# Patient Record
Sex: MALE | Race: WHITE | Employment: OTHER | URBAN - METROPOLITAN AREA
[De-identification: names, ages, dates, MRNs, and addresses within clinical notes are randomized per-mention and may not be internally consistent; named-entity substitution may affect disease eponyms.]

---

## 2024-03-05 ENCOUNTER — OFFICE VISIT (OUTPATIENT)
Dept: BARIATRICS/WEIGHT MGMT | Age: 64
End: 2024-03-05
Payer: MEDICARE

## 2024-03-05 VITALS
HEIGHT: 72 IN | BODY MASS INDEX: 42.66 KG/M2 | WEIGHT: 315 LBS | DIASTOLIC BLOOD PRESSURE: 80 MMHG | HEART RATE: 64 BPM | OXYGEN SATURATION: 95 % | SYSTOLIC BLOOD PRESSURE: 120 MMHG

## 2024-03-05 DIAGNOSIS — F32.A DEPRESSION, UNSPECIFIED DEPRESSION TYPE: ICD-10-CM

## 2024-03-05 DIAGNOSIS — R73.9 HYPERGLYCEMIA: ICD-10-CM

## 2024-03-05 DIAGNOSIS — M19.90 ARTHRITIS: ICD-10-CM

## 2024-03-05 DIAGNOSIS — Z90.49 S/P CHOLECYSTECTOMY: ICD-10-CM

## 2024-03-05 DIAGNOSIS — K44.9 HIATAL HERNIA: Primary | ICD-10-CM

## 2024-03-05 DIAGNOSIS — E66.01 OBESITY, CLASS III, BMI 40-49.9 (MORBID OBESITY) (HCC): ICD-10-CM

## 2024-03-05 DIAGNOSIS — Z86.19 HISTORY OF HEPATITIS B: ICD-10-CM

## 2024-03-05 PROCEDURE — G8484 FLU IMMUNIZE NO ADMIN: HCPCS | Performed by: NURSE PRACTITIONER

## 2024-03-05 PROCEDURE — 1123F ACP DISCUSS/DSCN MKR DOCD: CPT | Performed by: NURSE PRACTITIONER

## 2024-03-05 PROCEDURE — G8427 DOCREV CUR MEDS BY ELIG CLIN: HCPCS | Performed by: NURSE PRACTITIONER

## 2024-03-05 PROCEDURE — G8417 CALC BMI ABV UP PARAM F/U: HCPCS | Performed by: NURSE PRACTITIONER

## 2024-03-05 PROCEDURE — 1036F TOBACCO NON-USER: CPT | Performed by: NURSE PRACTITIONER

## 2024-03-05 PROCEDURE — 99204 OFFICE O/P NEW MOD 45 MIN: CPT | Performed by: NURSE PRACTITIONER

## 2024-03-05 PROCEDURE — 3017F COLORECTAL CA SCREEN DOC REV: CPT | Performed by: NURSE PRACTITIONER

## 2024-03-05 RX ORDER — SILDENAFIL 100 MG/1
100 TABLET, FILM COATED ORAL PRN
COMMUNITY
Start: 2024-03-04

## 2024-03-05 RX ORDER — TAMSULOSIN HYDROCHLORIDE 0.4 MG/1
0.4 CAPSULE ORAL DAILY
COMMUNITY
Start: 2024-02-06

## 2024-03-05 RX ORDER — ENTECAVIR 1 MG/1
1 TABLET, FILM COATED ORAL DAILY
COMMUNITY
Start: 2024-01-10

## 2024-03-05 RX ORDER — TADALAFIL 20 MG/1
20 TABLET ORAL PRN
COMMUNITY

## 2024-03-05 NOTE — PROGRESS NOTES
HMR NP Clinical Induction for [employer] Diet Progress Note    Subjective     The patient is a 64 y.o. male being seen regarding supervised weight loss.  The patient's PCP is Adeola Lancaster APRN - NP .  Highest adult weight was 355 lbs and lowest adult weight was 225 lbs.  His Body mass index is 46.35 kg/m².  His weight goal is 250-260 lbs.  The patient reports that his obesity is significantly affecting his life on a daily basis.      Weight Loss Program History:   He has tried HMR, Weight Watchers, Atkins, keto, Low Fat, Medifast, and Optifast diets.  These attempts have been somewhat successful with weight loss, but have failed to sustain adequate weight loss. The patient has also tried self directed diet and exercise in attempts to sustain weight loss.     Comorbid Conditions:  Significant diseases affecting this patient are: Hiatal Hernia, Depression, Hx Hepatitis B, Arthritis, s/p cholecystectomy      Allergies:  Allergies   Allergen Reactions    Oxycodone Nausea Only     States physically ill       Past Medical History:     Past Medical History:   Diagnosis Date    Abdominal hernia     Arthritis     Depression     History of hepatitis B    .    Past Surgical History:  Past Surgical History:   Procedure Laterality Date    CHOLECYSTECTOMY         Family History:  Family History   Problem Relation Age of Onset    Depression Mother     Cancer Mother     Arthritis Mother     Arthritis Father     Cancer Maternal Grandfather     Depression Son     Diabetes Maternal Uncle        Social History:  Social History     Socioeconomic History    Marital status: Single     Spouse name: Not on file    Number of children: Not on file    Years of education: Not on file    Highest education level: Not on file   Occupational History    Not on file   Tobacco Use    Smoking status: Former     Current packs/day: 1.00     Average packs/day: 0.5 packs/day for 72.2 years (36.2 ttl pk-yrs)     Types: Cigarettes

## 2024-03-06 ENCOUNTER — HOSPITAL ENCOUNTER (OUTPATIENT)
Age: 64
Discharge: HOME | End: 2024-03-06
Payer: MEDICARE

## 2024-03-06 DIAGNOSIS — E66.01 OBESITY, CLASS III, BMI 40-49.9 (MORBID OBESITY) (HCC): ICD-10-CM

## 2024-03-06 DIAGNOSIS — F32.A DEPRESSION, UNSPECIFIED DEPRESSION TYPE: ICD-10-CM

## 2024-03-06 DIAGNOSIS — Z86.19 HISTORY OF HEPATITIS B: ICD-10-CM

## 2024-03-06 DIAGNOSIS — K44.9 HIATAL HERNIA: ICD-10-CM

## 2024-03-06 DIAGNOSIS — R73.9 HYPERGLYCEMIA: ICD-10-CM

## 2024-03-06 DIAGNOSIS — M19.90 ARTHRITIS: ICD-10-CM

## 2024-03-06 LAB
ANION GAP SERPL CALCULATED.3IONS-SCNC: NORMAL MMOL/L (ref 9–17)
BASOPHILS # BLD: 0.06 K/UL (ref 0–0.2)
BASOPHILS NFR BLD: 1 % (ref 0–2)
EOSINOPHIL # BLD: 0.11 K/UL (ref 0–0.44)
EOSINOPHILS RELATIVE PERCENT: 1 % (ref 1–4)
ERYTHROCYTE [DISTWIDTH] IN BLOOD BY AUTOMATED COUNT: 13.7 % (ref 11.8–14.4)
EST. AVERAGE GLUCOSE BLD GHB EST-MCNC: 97 MG/DL
HBA1C MFR BLD: 5 % (ref 4–6)
HCT VFR BLD AUTO: 45.4 % (ref 40.7–50.3)
IMM GRANULOCYTES # BLD AUTO: 0.03 K/UL (ref 0–0.3)
IMM GRANULOCYTES NFR BLD: 0 %
LDLC SERPL CALC-MCNC: NORMAL MG/DL (ref 0–130)
LYMPHOCYTES NFR BLD: 1.74 K/UL (ref 1.1–3.7)
MCH RBC QN AUTO: 30.9 PG (ref 25.2–33.5)
MCHC RBC AUTO-ENTMCNC: 32.4 G/DL (ref 28.4–34.8)
MCV RBC AUTO: 95.6 FL (ref 82.6–102.9)
MONOCYTES NFR BLD: 0.53 K/UL (ref 0.1–1.2)
MONOCYTES NFR BLD: 7 % (ref 3–12)
NRBC BLD-RTO: 0 PER 100 WBC
PLATELET # BLD AUTO: 171 K/UL (ref 138–453)
PMV BLD AUTO: 12.1 FL (ref 8.1–13.5)
POTASSIUM SERPL-SCNC: NORMAL MMOL/L (ref 3.7–5.3)
RBC # BLD AUTO: 4.75 M/UL (ref 4.21–5.77)

## 2024-03-06 PROCEDURE — 84443 ASSAY THYROID STIM HORMONE: CPT

## 2024-03-06 PROCEDURE — 80061 LIPID PANEL: CPT

## 2024-03-06 PROCEDURE — 36415 COLL VENOUS BLD VENIPUNCTURE: CPT

## 2024-03-06 PROCEDURE — 84550 ASSAY OF BLOOD/URIC ACID: CPT

## 2024-03-06 PROCEDURE — 85025 COMPLETE CBC W/AUTO DIFF WBC: CPT

## 2024-03-06 PROCEDURE — 93005 ELECTROCARDIOGRAM TRACING: CPT | Performed by: NURSE PRACTITIONER

## 2024-03-06 PROCEDURE — 83036 HEMOGLOBIN GLYCOSYLATED A1C: CPT

## 2024-03-06 PROCEDURE — 80053 COMPREHEN METABOLIC PANEL: CPT

## 2024-03-08 LAB
EKG ATRIAL RATE: 54 BPM
EKG P-R INTERVAL: 156 MS
EKG Q-T INTERVAL: 426 MS
EKG QRS DURATION: 110 MS
EKG QTC CALCULATION (BAZETT): 403 MS
EKG R AXIS: -10 DEGREES
EKG T AXIS: 50 DEGREES
EKG VENTRICULAR RATE: 54 BPM

## 2024-03-11 DIAGNOSIS — K44.9 HIATAL HERNIA: ICD-10-CM

## 2024-03-11 DIAGNOSIS — Z90.49 S/P CHOLECYSTECTOMY: ICD-10-CM

## 2024-03-11 DIAGNOSIS — Z86.19 HISTORY OF HEPATITIS B: ICD-10-CM

## 2024-03-11 DIAGNOSIS — E66.01 OBESITY, CLASS III, BMI 40-49.9 (MORBID OBESITY) (HCC): Primary | ICD-10-CM

## 2024-03-11 DIAGNOSIS — R73.9 HYPERGLYCEMIA: ICD-10-CM

## 2024-03-11 DIAGNOSIS — F32.A DEPRESSION, UNSPECIFIED DEPRESSION TYPE: ICD-10-CM

## 2024-03-11 DIAGNOSIS — M19.90 ARTHRITIS: ICD-10-CM

## 2024-03-12 ENCOUNTER — HOSPITAL ENCOUNTER (OUTPATIENT)
Age: 64
Setting detail: SPECIMEN
Discharge: HOME | End: 2024-03-12

## 2024-03-12 ENCOUNTER — TELEPHONE (OUTPATIENT)
Dept: BARIATRICS/WEIGHT MGMT | Age: 64
End: 2024-03-12

## 2024-03-12 DIAGNOSIS — F32.A DEPRESSION, UNSPECIFIED DEPRESSION TYPE: ICD-10-CM

## 2024-03-12 DIAGNOSIS — Z90.49 S/P CHOLECYSTECTOMY: ICD-10-CM

## 2024-03-12 DIAGNOSIS — M19.90 ARTHRITIS: ICD-10-CM

## 2024-03-12 DIAGNOSIS — Z86.19 HISTORY OF HEPATITIS B: ICD-10-CM

## 2024-03-12 DIAGNOSIS — E66.01 OBESITY, CLASS III, BMI 40-49.9 (MORBID OBESITY) (HCC): ICD-10-CM

## 2024-03-12 DIAGNOSIS — R73.9 HYPERGLYCEMIA: ICD-10-CM

## 2024-03-12 DIAGNOSIS — K44.9 HIATAL HERNIA: ICD-10-CM

## 2024-03-12 LAB
ALBUMIN SERPL-MCNC: 3.9 G/DL (ref 3.5–5.2)
ALBUMIN/GLOB SERPL: 1 {RATIO} (ref 1–2.5)
ALP SERPL-CCNC: 78 U/L (ref 40–129)
ALT SERPL-CCNC: 17 U/L (ref 10–50)
ANION GAP SERPL CALCULATED.3IONS-SCNC: 11 MMOL/L (ref 9–16)
AST SERPL-CCNC: 15 U/L (ref 10–50)
BILIRUB SERPL-MCNC: 1 MG/DL (ref 0–1.2)
BUN SERPL-MCNC: 19 MG/DL (ref 8–23)
CALCIUM SERPL-MCNC: 9.1 MG/DL (ref 8.6–10.4)
CHLORIDE SERPL-SCNC: 106 MMOL/L (ref 98–107)
CHOLEST SERPL-MCNC: 175 MG/DL (ref 0–199)
CHOLESTEROL/HDL RATIO: 3
CO2 SERPL-SCNC: 22 MMOL/L (ref 20–31)
CREAT SERPL-MCNC: 0.7 MG/DL (ref 0.7–1.2)
GFR SERPL CREATININE-BSD FRML MDRD: >60 ML/MIN/1.73M2
GLUCOSE SERPL-MCNC: 96 MG/DL (ref 74–99)
HDLC SERPL-MCNC: 55 MG/DL
LDLC SERPL CALC-MCNC: 103 MG/DL (ref 0–100)
POTASSIUM SERPL-SCNC: 4.3 MMOL/L (ref 3.7–5.3)
PROT SERPL-MCNC: 7.1 G/DL (ref 6.6–8.7)
SODIUM SERPL-SCNC: 139 MMOL/L (ref 136–145)
TRIGL SERPL-MCNC: 86 MG/DL
TSH SERPL DL<=0.05 MIU/L-ACNC: 1.12 UIU/ML (ref 0.27–4.2)
URATE SERPL-MCNC: 4.2 MG/DL (ref 3.4–7)
VLDLC SERPL CALC-MCNC: 17 MG/DL

## 2024-03-20 ENCOUNTER — OFFICE VISIT (OUTPATIENT)
Dept: BARIATRICS/WEIGHT MGMT | Age: 64
End: 2024-03-20
Payer: MEDICARE

## 2024-03-20 VITALS
DIASTOLIC BLOOD PRESSURE: 88 MMHG | WEIGHT: 315 LBS | SYSTOLIC BLOOD PRESSURE: 128 MMHG | BODY MASS INDEX: 42.66 KG/M2 | HEIGHT: 72 IN | HEART RATE: 60 BPM | OXYGEN SATURATION: 98 %

## 2024-03-20 DIAGNOSIS — K44.9 HIATAL HERNIA: ICD-10-CM

## 2024-03-20 DIAGNOSIS — F32.A DEPRESSION, UNSPECIFIED DEPRESSION TYPE: ICD-10-CM

## 2024-03-20 DIAGNOSIS — M19.90 ARTHRITIS: Primary | ICD-10-CM

## 2024-03-20 DIAGNOSIS — E66.01 OBESITY, CLASS III, BMI 40-49.9 (MORBID OBESITY) (HCC): ICD-10-CM

## 2024-03-20 PROCEDURE — 3017F COLORECTAL CA SCREEN DOC REV: CPT | Performed by: NURSE PRACTITIONER

## 2024-03-20 PROCEDURE — G8484 FLU IMMUNIZE NO ADMIN: HCPCS | Performed by: NURSE PRACTITIONER

## 2024-03-20 PROCEDURE — G8427 DOCREV CUR MEDS BY ELIG CLIN: HCPCS | Performed by: NURSE PRACTITIONER

## 2024-03-20 PROCEDURE — 99213 OFFICE O/P EST LOW 20 MIN: CPT | Performed by: NURSE PRACTITIONER

## 2024-03-20 PROCEDURE — 1123F ACP DISCUSS/DSCN MKR DOCD: CPT | Performed by: NURSE PRACTITIONER

## 2024-03-20 PROCEDURE — 1036F TOBACCO NON-USER: CPT | Performed by: NURSE PRACTITIONER

## 2024-03-20 PROCEDURE — G8417 CALC BMI ABV UP PARAM F/U: HCPCS | Performed by: NURSE PRACTITIONER

## 2024-03-20 NOTE — PROGRESS NOTES
Medically Supervised Weight Loss Follow-up Progress Note      Subjective     Patient is here for weight management program to follow-up for the chronic conditions of Hiatal Hernia, Depression, Hx Hepatitis B, Arthritis, s/p cholecystectomy .  Patient continues on [employer] diet plan.  Consistently getting in minimum food script and fluids.  Physical activity includes walking.  Total weight loss to date is 5 lbs.  No current issues.    Allergies:  Allergies   Allergen Reactions    Oxycodone Nausea Only     States physically ill       Past Medical History:     Past Medical History:   Diagnosis Date    Abdominal hernia     Arthritis     Depression     History of hepatitis B    .    Past Surgical History:  Past Surgical History:   Procedure Laterality Date    CHOLECYSTECTOMY         Family History:  Family History   Problem Relation Age of Onset    Depression Mother     Cancer Mother     Arthritis Mother     Arthritis Father     Cancer Maternal Grandfather     Depression Son     Diabetes Maternal Uncle        Social History:  Social History     Socioeconomic History    Marital status: Single     Spouse name: Not on file    Number of children: Not on file    Years of education: Not on file    Highest education level: Not on file   Occupational History    Not on file   Tobacco Use    Smoking status: Former     Current packs/day: 1.00     Average packs/day: 0.5 packs/day for 72.2 years (36.2 ttl pk-yrs)     Types: Cigarettes     Start date: 1/1/1988    Smokeless tobacco: Never   Vaping Use    Vaping Use: Former   Substance and Sexual Activity    Alcohol use: Not Currently    Drug use: Yes     Types: Marijuana (Weed)    Sexual activity: Not on file   Other Topics Concern    Not on file   Social History Narrative    Not on file     Social Determinants of Health     Financial Resource Strain: Not on file   Food Insecurity: Not on file   Transportation Needs: Not on file   Physical Activity: Not on file   Stress:

## 2024-03-27 ENCOUNTER — HOSPITAL ENCOUNTER (OUTPATIENT)
Age: 64
Setting detail: SPECIMEN
Discharge: HOME | End: 2024-03-27

## 2024-03-27 DIAGNOSIS — K44.9 HIATAL HERNIA: ICD-10-CM

## 2024-03-27 DIAGNOSIS — M19.90 ARTHRITIS: ICD-10-CM

## 2024-03-27 DIAGNOSIS — F32.A DEPRESSION, UNSPECIFIED DEPRESSION TYPE: ICD-10-CM

## 2024-03-27 DIAGNOSIS — E66.01 OBESITY, CLASS III, BMI 40-49.9 (MORBID OBESITY) (HCC): ICD-10-CM

## 2024-03-27 LAB
ALBUMIN SERPL-MCNC: 4.6 G/DL (ref 3.5–5.2)
ALBUMIN/GLOB SERPL: 1 {RATIO} (ref 1–2.5)
ALP SERPL-CCNC: 75 U/L (ref 40–129)
ALT SERPL-CCNC: 26 U/L (ref 10–50)
ANION GAP SERPL CALCULATED.3IONS-SCNC: 13 MMOL/L (ref 9–16)
AST SERPL-CCNC: 27 U/L (ref 10–50)
BILIRUB SERPL-MCNC: 1.3 MG/DL (ref 0–1.2)
BUN SERPL-MCNC: 12 MG/DL (ref 8–23)
CALCIUM SERPL-MCNC: 9.7 MG/DL (ref 8.6–10.4)
CHLORIDE SERPL-SCNC: 101 MMOL/L (ref 98–107)
CO2 SERPL-SCNC: 23 MMOL/L (ref 20–31)
CREAT SERPL-MCNC: 0.9 MG/DL (ref 0.7–1.2)
GFR SERPL CREATININE-BSD FRML MDRD: >90 ML/MIN/1.73M2
GLUCOSE SERPL-MCNC: 99 MG/DL (ref 74–99)
POTASSIUM SERPL-SCNC: 4.4 MMOL/L (ref 3.7–5.3)
PROT SERPL-MCNC: 7.9 G/DL (ref 6.6–8.7)
SODIUM SERPL-SCNC: 137 MMOL/L (ref 136–145)

## 2024-04-03 ENCOUNTER — OFFICE VISIT (OUTPATIENT)
Dept: BARIATRICS/WEIGHT MGMT | Age: 64
End: 2024-04-03
Payer: MEDICARE

## 2024-04-03 VITALS
WEIGHT: 315 LBS | HEIGHT: 71 IN | OXYGEN SATURATION: 98 % | DIASTOLIC BLOOD PRESSURE: 72 MMHG | SYSTOLIC BLOOD PRESSURE: 122 MMHG | BODY MASS INDEX: 44.1 KG/M2 | HEART RATE: 56 BPM

## 2024-04-03 DIAGNOSIS — Z86.19 HISTORY OF HEPATITIS B: ICD-10-CM

## 2024-04-03 DIAGNOSIS — K44.9 HIATAL HERNIA: ICD-10-CM

## 2024-04-03 DIAGNOSIS — R73.9 HYPERGLYCEMIA: ICD-10-CM

## 2024-04-03 DIAGNOSIS — F32.A DEPRESSION, UNSPECIFIED DEPRESSION TYPE: ICD-10-CM

## 2024-04-03 DIAGNOSIS — M19.90 ARTHRITIS: Primary | ICD-10-CM

## 2024-04-03 DIAGNOSIS — E66.01 OBESITY, CLASS III, BMI 40-49.9 (MORBID OBESITY) (HCC): ICD-10-CM

## 2024-04-03 PROCEDURE — 99213 OFFICE O/P EST LOW 20 MIN: CPT | Performed by: NURSE PRACTITIONER

## 2024-04-03 PROCEDURE — 3017F COLORECTAL CA SCREEN DOC REV: CPT | Performed by: NURSE PRACTITIONER

## 2024-04-03 PROCEDURE — 1123F ACP DISCUSS/DSCN MKR DOCD: CPT | Performed by: NURSE PRACTITIONER

## 2024-04-03 PROCEDURE — 1036F TOBACCO NON-USER: CPT | Performed by: NURSE PRACTITIONER

## 2024-04-03 PROCEDURE — G8427 DOCREV CUR MEDS BY ELIG CLIN: HCPCS | Performed by: NURSE PRACTITIONER

## 2024-04-03 PROCEDURE — G8417 CALC BMI ABV UP PARAM F/U: HCPCS | Performed by: NURSE PRACTITIONER

## 2024-04-03 NOTE — PROGRESS NOTES
Medically Supervised Weight Loss Follow-up Progress Note      Subjective     Patient is here for weight management program to follow-up for the chronic conditions of Hiatal Hernia, Depression, Hx Hepatitis B, Arthritis, s/p cholecystectomy .  Patient continues on [employer] diet plan.  Consistently getting in minimum food script and fluids.  Physical activity includes swimming.  Total weight loss to date is 17 lbs.  No current issues.    Allergies:  Allergies   Allergen Reactions    Oxycodone Nausea Only     States physically ill       Past Medical History:     Past Medical History:   Diagnosis Date    Abdominal hernia     Arthritis     Depression     History of hepatitis B    .    Past Surgical History:  Past Surgical History:   Procedure Laterality Date    CHOLECYSTECTOMY         Family History:  Family History   Problem Relation Age of Onset    Depression Mother     Cancer Mother     Arthritis Mother     Arthritis Father     Cancer Maternal Grandfather     Depression Son     Diabetes Maternal Uncle        Social History:  Social History     Socioeconomic History    Marital status: Single     Spouse name: Not on file    Number of children: Not on file    Years of education: Not on file    Highest education level: Not on file   Occupational History    Not on file   Tobacco Use    Smoking status: Former     Current packs/day: 0.00     Average packs/day: 1 pack/day for 35.2 years (35.2 ttl pk-yrs)     Types: Cigarettes     Start date: 1988     Quit date: 2023     Years since quittin.0    Smokeless tobacco: Never   Vaping Use    Vaping Use: Former   Substance and Sexual Activity    Alcohol use: Not Currently    Drug use: Yes     Types: Marijuana (Weed)    Sexual activity: Not on file   Other Topics Concern    Not on file   Social History Narrative    Not on file     Social Determinants of Health     Financial Resource Strain: Not on file   Food Insecurity: Not on file   Transportation Needs: Not

## 2024-05-08 ENCOUNTER — OFFICE VISIT (OUTPATIENT)
Dept: BARIATRICS/WEIGHT MGMT | Age: 64
End: 2024-05-08
Payer: MEDICARE

## 2024-05-08 ENCOUNTER — HOSPITAL ENCOUNTER (OUTPATIENT)
Age: 64
Setting detail: SPECIMEN
Discharge: HOME | End: 2024-05-08

## 2024-05-08 VITALS
HEART RATE: 68 BPM | BODY MASS INDEX: 42.4 KG/M2 | SYSTOLIC BLOOD PRESSURE: 128 MMHG | WEIGHT: 304 LBS | DIASTOLIC BLOOD PRESSURE: 82 MMHG

## 2024-05-08 DIAGNOSIS — F32.A DEPRESSION, UNSPECIFIED DEPRESSION TYPE: ICD-10-CM

## 2024-05-08 DIAGNOSIS — E66.01 OBESITY, CLASS III, BMI 40-49.9 (MORBID OBESITY) (HCC): ICD-10-CM

## 2024-05-08 DIAGNOSIS — M19.90 ARTHRITIS: ICD-10-CM

## 2024-05-08 DIAGNOSIS — R73.9 HYPERGLYCEMIA: Primary | ICD-10-CM

## 2024-05-08 DIAGNOSIS — K44.9 HIATAL HERNIA: ICD-10-CM

## 2024-05-08 LAB — PSA SERPL-MCNC: 0.3 NG/ML (ref 0–4)

## 2024-05-08 PROCEDURE — 1123F ACP DISCUSS/DSCN MKR DOCD: CPT | Performed by: NURSE PRACTITIONER

## 2024-05-08 PROCEDURE — 3017F COLORECTAL CA SCREEN DOC REV: CPT | Performed by: NURSE PRACTITIONER

## 2024-05-08 PROCEDURE — G8417 CALC BMI ABV UP PARAM F/U: HCPCS | Performed by: NURSE PRACTITIONER

## 2024-05-08 PROCEDURE — 1036F TOBACCO NON-USER: CPT | Performed by: NURSE PRACTITIONER

## 2024-05-08 PROCEDURE — G8427 DOCREV CUR MEDS BY ELIG CLIN: HCPCS | Performed by: NURSE PRACTITIONER

## 2024-05-08 PROCEDURE — 99213 OFFICE O/P EST LOW 20 MIN: CPT | Performed by: NURSE PRACTITIONER

## 2024-05-08 NOTE — PROGRESS NOTES
Medically Supervised Weight Loss Follow-up Progress Note      Subjective     Patient is here for weight management program to follow-up for the chronic conditions of Hiatal Hernia, Depression, Hx Hepatitis B, Arthritis, s/p cholecystectomy .  Patient continues on [employer] diet plan.  Consistently getting in minimum food script and fluids.  Physical activity includes swimming.  Total weight loss to date is 33 lbs.  No current issues.    Allergies:  Allergies   Allergen Reactions    Oxycodone Nausea Only     States physically ill       Past Medical History:     Past Medical History:   Diagnosis Date    Abdominal hernia     Arthritis     Depression     History of hepatitis B    .    Past Surgical History:  Past Surgical History:   Procedure Laterality Date    CHOLECYSTECTOMY         Family History:  Family History   Problem Relation Age of Onset    Depression Mother     Cancer Mother     Arthritis Mother     Arthritis Father     Cancer Maternal Grandfather     Depression Son     Diabetes Maternal Uncle        Social History:  Social History     Socioeconomic History    Marital status: Single     Spouse name: Not on file    Number of children: Not on file    Years of education: Not on file    Highest education level: Not on file   Occupational History    Not on file   Tobacco Use    Smoking status: Former     Current packs/day: 0.00     Average packs/day: 1 pack/day for 35.2 years (35.2 ttl pk-yrs)     Types: Cigarettes     Start date: 1988     Quit date: 2023     Years since quittin.1    Smokeless tobacco: Never   Vaping Use    Vaping Use: Former   Substance and Sexual Activity    Alcohol use: Not Currently    Drug use: Yes     Types: Marijuana (Weed)    Sexual activity: Not on file   Other Topics Concern    Not on file   Social History Narrative    Not on file     Social Determinants of Health     Financial Resource Strain: Not on file   Food Insecurity: Not on file   Transportation Needs: Not

## 2024-06-19 ENCOUNTER — OFFICE VISIT (OUTPATIENT)
Dept: BARIATRICS/WEIGHT MGMT | Age: 64
End: 2024-06-19
Payer: MEDICARE

## 2024-06-19 ENCOUNTER — HOSPITAL ENCOUNTER (OUTPATIENT)
Age: 64
Setting detail: SPECIMEN
Discharge: HOME | End: 2024-06-19

## 2024-06-19 VITALS
WEIGHT: 286.9 LBS | OXYGEN SATURATION: 97 % | HEIGHT: 71 IN | SYSTOLIC BLOOD PRESSURE: 120 MMHG | HEART RATE: 50 BPM | DIASTOLIC BLOOD PRESSURE: 70 MMHG | BODY MASS INDEX: 40.17 KG/M2

## 2024-06-19 DIAGNOSIS — M19.90 ARTHRITIS: Primary | ICD-10-CM

## 2024-06-19 DIAGNOSIS — R73.9 HYPERGLYCEMIA: ICD-10-CM

## 2024-06-19 DIAGNOSIS — E66.01 OBESITY, CLASS III, BMI 40-49.9 (MORBID OBESITY) (HCC): ICD-10-CM

## 2024-06-19 DIAGNOSIS — K44.9 HIATAL HERNIA: ICD-10-CM

## 2024-06-19 DIAGNOSIS — M19.90 ARTHRITIS: ICD-10-CM

## 2024-06-19 LAB
ALBUMIN SERPL-MCNC: 4.2 G/DL (ref 3.5–5.2)
ALBUMIN/GLOB SERPL: 1 {RATIO} (ref 1–2.5)
ALP SERPL-CCNC: 87 U/L (ref 40–129)
ALT SERPL-CCNC: 24 U/L (ref 10–50)
ANION GAP SERPL CALCULATED.3IONS-SCNC: 13 MMOL/L (ref 9–16)
AST SERPL-CCNC: 24 U/L (ref 10–50)
BILIRUB SERPL-MCNC: 0.9 MG/DL (ref 0–1.2)
BUN SERPL-MCNC: 13 MG/DL (ref 8–23)
CALCIUM SERPL-MCNC: 9.5 MG/DL (ref 8.6–10.4)
CHLORIDE SERPL-SCNC: 103 MMOL/L (ref 98–107)
CO2 SERPL-SCNC: 23 MMOL/L (ref 20–31)
CREAT SERPL-MCNC: 0.7 MG/DL (ref 0.7–1.2)
GFR, ESTIMATED: >90 ML/MIN/1.73M2
GLUCOSE SERPL-MCNC: 81 MG/DL (ref 74–99)
POTASSIUM SERPL-SCNC: 4.5 MMOL/L (ref 3.7–5.3)
PROT SERPL-MCNC: 7.6 G/DL (ref 6.6–8.7)
SODIUM SERPL-SCNC: 139 MMOL/L (ref 136–145)

## 2024-06-19 PROCEDURE — G8427 DOCREV CUR MEDS BY ELIG CLIN: HCPCS | Performed by: NURSE PRACTITIONER

## 2024-06-19 PROCEDURE — 1123F ACP DISCUSS/DSCN MKR DOCD: CPT | Performed by: NURSE PRACTITIONER

## 2024-06-19 PROCEDURE — 99213 OFFICE O/P EST LOW 20 MIN: CPT | Performed by: NURSE PRACTITIONER

## 2024-06-19 PROCEDURE — 3017F COLORECTAL CA SCREEN DOC REV: CPT | Performed by: NURSE PRACTITIONER

## 2024-06-19 PROCEDURE — 1036F TOBACCO NON-USER: CPT | Performed by: NURSE PRACTITIONER

## 2024-06-19 PROCEDURE — G8417 CALC BMI ABV UP PARAM F/U: HCPCS | Performed by: NURSE PRACTITIONER

## 2024-06-19 RX ORDER — AZELASTINE 1 MG/ML
2 SPRAY, METERED NASAL 2 TIMES DAILY
COMMUNITY
Start: 2024-05-14

## 2024-06-19 RX ORDER — ONDANSETRON 8 MG/1
8 TABLET, ORALLY DISINTEGRATING ORAL EVERY 8 HOURS PRN
COMMUNITY

## 2024-06-19 RX ORDER — FINASTERIDE 5 MG/1
5 TABLET, FILM COATED ORAL DAILY
COMMUNITY
Start: 2024-06-10

## 2024-06-19 NOTE — PROGRESS NOTES
Medically Supervised Weight Loss Follow-up Progress Note      Subjective     Patient is here for weight management program to follow-up for the chronic conditions of Hiatal Hernia, Depression, Hx Hepatitis B, Arthritis, s/p cholecystectomy .  Patient continues on [employer] diet plan.  Consistently getting in minimum food script and fluids.  Physical activity includes swimming.  Total weight loss to date is 51 lbs.  No current issues.  He is planning to have left knee replacement done 24.    Allergies:  Allergies   Allergen Reactions    Oxycodone Nausea Only     States physically ill       Past Medical History:     Past Medical History:   Diagnosis Date    Abdominal hernia     Arthritis     Depression     History of hepatitis B    .    Past Surgical History:  Past Surgical History:   Procedure Laterality Date    CHOLECYSTECTOMY         Family History:  Family History   Problem Relation Age of Onset    Depression Mother     Cancer Mother     Arthritis Mother     Arthritis Father     Cancer Maternal Grandfather     Depression Son     Diabetes Maternal Uncle        Social History:  Social History     Socioeconomic History    Marital status: Single     Spouse name: Not on file    Number of children: Not on file    Years of education: Not on file    Highest education level: Not on file   Occupational History    Not on file   Tobacco Use    Smoking status: Former     Current packs/day: 0.00     Average packs/day: 1 pack/day for 35.2 years (35.2 ttl pk-yrs)     Types: Cigarettes     Start date: 1988     Quit date: 2023     Years since quittin.2    Smokeless tobacco: Never   Vaping Use    Vaping Use: Former   Substance and Sexual Activity    Alcohol use: Not Currently    Drug use: Yes     Types: Marijuana (Weed)    Sexual activity: Not on file   Other Topics Concern    Not on file   Social History Narrative    Not on file     Social Determinants of Health     Financial Resource Strain: Not on

## 2024-07-24 ENCOUNTER — TELEPHONE (OUTPATIENT)
Dept: BARIATRICS/WEIGHT MGMT | Age: 64
End: 2024-07-24

## 2024-07-24 DIAGNOSIS — R73.9 HYPERGLYCEMIA: ICD-10-CM

## 2024-07-24 DIAGNOSIS — M19.90 ARTHRITIS: Primary | ICD-10-CM

## 2024-07-24 DIAGNOSIS — E66.01 OBESITY, CLASS III, BMI 40-49.9 (MORBID OBESITY) (HCC): ICD-10-CM

## 2024-07-24 DIAGNOSIS — K44.9 HIATAL HERNIA: ICD-10-CM

## 2024-07-24 DIAGNOSIS — F32.A DEPRESSION, UNSPECIFIED DEPRESSION TYPE: ICD-10-CM

## 2024-07-24 NOTE — TELEPHONE ENCOUNTER
Vida brought me a change of status form he is changing to at home HMR due to moving. He is driving over an hour to come in. Can you add dx and approve exit labs pended below. Thanks.

## 2024-07-24 NOTE — TELEPHONE ENCOUNTER
Left voicemail to get labs done before 07/31 appt. And to let us know if we need to fax the orders anywhere for him.

## 2024-08-15 ENCOUNTER — TELEPHONE (OUTPATIENT)
Dept: BARIATRICS/WEIGHT MGMT | Age: 64
End: 2024-08-15

## 2024-08-15 NOTE — TELEPHONE ENCOUNTER
Spoke to patient about getting his exit labs done and scheduling his exit appointment. He is going to call back once her gets the labs done to schedule.

## 2024-08-16 LAB
ALBUMIN: 4.1 G/DL
ALP BLD-CCNC: 74 U/L
ALT SERPL-CCNC: 13 U/L
ANION GAP SERPL CALCULATED.3IONS-SCNC: 10 MMOL/L
AST SERPL-CCNC: 14 U/L
BASOPHILS ABSOLUTE: 0.1 /ΜL
BASOPHILS RELATIVE PERCENT: 0.8 %
BILIRUB SERPL-MCNC: 0.9 MG/DL (ref 0.1–1.4)
BUN BLDV-MCNC: 19 MG/DL
CALCIUM SERPL-MCNC: 9.4 MG/DL
CHLORIDE BLD-SCNC: 105 MMOL/L
CHOLESTEROL, TOTAL: 153 MG/DL
CHOLESTEROL/HDL RATIO: 3
CO2: 24 MMOL/L
CREAT SERPL-MCNC: 0.57 MG/DL
EOSINOPHILS ABSOLUTE: 0 /ΜL
EOSINOPHILS RELATIVE PERCENT: 0 %
ESTIMATED AVERAGE GLUCOSE: 100
GFR, ESTIMATED: >90
GLUCOSE BLD-MCNC: 92 MG/DL
HBA1C MFR BLD: 5.1 %
HCT VFR BLD CALC: 38.5 % (ref 41–53)
HDLC SERPL-MCNC: 51 MG/DL (ref 35–70)
HEMOGLOBIN: 12.9 G/DL (ref 13.5–17.5)
LDL CHOLESTEROL: 88
LYMPHOCYTES ABSOLUTE: 1.4 /ΜL
LYMPHOCYTES RELATIVE PERCENT: 18.4 %
MCH RBC QN AUTO: 31.2 PG
MCHC RBC AUTO-ENTMCNC: 33.6 G/DL
MCV RBC AUTO: 93 FL
MONOCYTES ABSOLUTE: 0.5 /ΜL
MONOCYTES RELATIVE PERCENT: 6.7 %
NEUTROPHILS ABSOLUTE: 5.6 /ΜL
NEUTROPHILS RELATIVE PERCENT: 74.1 %
NONHDLC SERPL-MCNC: NORMAL MG/DL
PDW BLD-RTO: 13.9 %
PLATELET # BLD: 186 K/ΜL
PMV BLD AUTO: 9.6 FL
POTASSIUM SERPL-SCNC: 4.2 MMOL/L
RBC # BLD: 4.15 10^6/ΜL
SODIUM BLD-SCNC: 139 MMOL/L
TOTAL PROTEIN: 7.5 G/DL (ref 6.4–8.2)
TRIGL SERPL-MCNC: 70 MG/DL
URIC ACID: 4.8
VLDLC SERPL CALC-MCNC: 14 MG/DL
WBC # BLD: 7.6 10^3/ML

## 2024-08-19 DIAGNOSIS — R73.9 HYPERGLYCEMIA: ICD-10-CM

## 2024-08-19 DIAGNOSIS — K44.9 HIATAL HERNIA: ICD-10-CM

## 2024-08-19 DIAGNOSIS — F32.A DEPRESSION, UNSPECIFIED DEPRESSION TYPE: ICD-10-CM

## 2024-08-19 DIAGNOSIS — M19.90 ARTHRITIS: ICD-10-CM

## 2024-08-19 DIAGNOSIS — E66.01 OBESITY, CLASS III, BMI 40-49.9 (MORBID OBESITY) (HCC): ICD-10-CM

## 2024-09-05 ENCOUNTER — OFFICE VISIT (OUTPATIENT)
Dept: BARIATRICS/WEIGHT MGMT | Age: 64
End: 2024-09-05
Payer: MEDICARE

## 2024-09-05 VITALS
WEIGHT: 278 LBS | SYSTOLIC BLOOD PRESSURE: 124 MMHG | DIASTOLIC BLOOD PRESSURE: 82 MMHG | BODY MASS INDEX: 38.77 KG/M2 | HEART RATE: 64 BPM | OXYGEN SATURATION: 97 %

## 2024-09-05 DIAGNOSIS — F32.A DEPRESSION, UNSPECIFIED DEPRESSION TYPE: ICD-10-CM

## 2024-09-05 DIAGNOSIS — Z96.652 S/P TOTAL KNEE REPLACEMENT, LEFT: ICD-10-CM

## 2024-09-05 DIAGNOSIS — M19.90 ARTHRITIS: ICD-10-CM

## 2024-09-05 DIAGNOSIS — K44.9 HIATAL HERNIA: Primary | ICD-10-CM

## 2024-09-05 DIAGNOSIS — E66.9 OBESITY (BMI 30-39.9): ICD-10-CM

## 2024-09-05 PROBLEM — R73.9 HYPERGLYCEMIA: Status: RESOLVED | Noted: 2024-03-05 | Resolved: 2024-09-05

## 2024-09-05 PROBLEM — E66.01 OBESITY, CLASS III, BMI 40-49.9 (MORBID OBESITY) (HCC): Status: RESOLVED | Noted: 2024-03-05 | Resolved: 2024-09-05

## 2024-09-05 PROCEDURE — G8427 DOCREV CUR MEDS BY ELIG CLIN: HCPCS | Performed by: NURSE PRACTITIONER

## 2024-09-05 PROCEDURE — 1036F TOBACCO NON-USER: CPT | Performed by: NURSE PRACTITIONER

## 2024-09-05 PROCEDURE — 99213 OFFICE O/P EST LOW 20 MIN: CPT | Performed by: NURSE PRACTITIONER

## 2024-09-05 PROCEDURE — G8417 CALC BMI ABV UP PARAM F/U: HCPCS | Performed by: NURSE PRACTITIONER

## 2024-09-05 PROCEDURE — 1123F ACP DISCUSS/DSCN MKR DOCD: CPT | Performed by: NURSE PRACTITIONER

## 2024-09-05 PROCEDURE — 3017F COLORECTAL CA SCREEN DOC REV: CPT | Performed by: NURSE PRACTITIONER

## 2024-09-05 NOTE — PROGRESS NOTES
Medically Supervised Weight Loss [facility] Diet Exit Note      Subjective     Patient is here for medically supervised weight loss exit visit for the chronic conditions of Hiatal Hernia, Arthritis, Depression, s/p Total Left Knee Replacement.  Patient has been on [employer] diet plan and is ready to transition to Phase 2.  No current issues.    Total weight loss: - 59 lbs  BMI reduction: - 7.65 kg/m2  Waist circumference: - 5 inches  HgbA1c change: + 0.1 %  Glucose change: - 4 mg/dl  Total Cholesterol change: - 22 mg/dl  HDL change: - 4 mg/dl  LDL change: - 15 mg/dl  Triglyceride change : - 16 mg/dl    Allergies:  Allergies   Allergen Reactions    Oxycodone Nausea Only     States physically ill       Past Medical History:     Past Medical History:   Diagnosis Date    Abdominal hernia     Arthritis     Depression     History of hepatitis B    .    Past Surgical History:  Past Surgical History:   Procedure Laterality Date    CHOLECYSTECTOMY         Family History:  Family History   Problem Relation Age of Onset    Depression Mother     Cancer Mother     Arthritis Mother     Arthritis Father     Cancer Maternal Grandfather     Depression Son     Diabetes Maternal Uncle        Social History:  Social History     Socioeconomic History    Marital status: Single     Spouse name: Not on file    Number of children: Not on file    Years of education: Not on file    Highest education level: Not on file   Occupational History    Not on file   Tobacco Use    Smoking status: Former     Current packs/day: 0.00     Average packs/day: 1 pack/day for 35.2 years (35.2 ttl pk-yrs)     Types: Cigarettes     Start date: 1988     Quit date: 2023     Years since quittin.4    Smokeless tobacco: Never   Vaping Use    Vaping status: Former   Substance and Sexual Activity    Alcohol use: Not Currently    Drug use: Yes     Types: Marijuana (Weed)    Sexual activity: Not on file   Other Topics Concern    Not on file   Social